# Patient Record
Sex: MALE | URBAN - METROPOLITAN AREA
[De-identification: names, ages, dates, MRNs, and addresses within clinical notes are randomized per-mention and may not be internally consistent; named-entity substitution may affect disease eponyms.]

---

## 2020-07-25 ENCOUNTER — TELEPHONE ENCOUNTER (OUTPATIENT)
Dept: URBAN - METROPOLITAN AREA CLINIC 13 | Facility: CLINIC | Age: 65
End: 2020-07-25

## 2020-07-26 ENCOUNTER — TELEPHONE ENCOUNTER (OUTPATIENT)
Dept: URBAN - METROPOLITAN AREA CLINIC 13 | Facility: CLINIC | Age: 65
End: 2020-07-26

## 2020-07-26 RX ORDER — SODIUM SULFATE, POTASSIUM SULFATE, MAGNESIUM SULFATE 17.5; 3.13; 1.6 G/ML; G/ML; G/ML
5PM THE DAY BEFORE PROCEDURE, DRINK 1/2 OF PREP, THEN 6HOURS BEFORE PROCEDURE DRINK REMAINDER OF PREP SOLUTION, CONCENTRATE ORAL
Qty: 1 | Refills: 0 | Status: ACTIVE | COMMUNITY
Start: 2019-04-18

## 2023-03-29 ENCOUNTER — TELEPHONE ENCOUNTER (OUTPATIENT)
Dept: URBAN - METROPOLITAN AREA CLINIC 72 | Facility: CLINIC | Age: 68
End: 2023-03-29

## 2023-03-29 ENCOUNTER — DASHBOARD ENCOUNTERS (OUTPATIENT)
Age: 68
End: 2023-03-29

## 2023-03-29 VITALS — HEIGHT: 73 IN | BODY MASS INDEX: 23.86 KG/M2 | WEIGHT: 180 LBS

## 2023-03-29 RX ORDER — SODIUM SULFATE, POTASSIUM SULFATE, MAGNESIUM SULFATE 17.5; 3.13; 1.6 G/ML; G/ML; G/ML
5PM THE DAY BEFORE PROCEDURE, DRINK 1/2 OF PREP, THEN 6HOURS BEFORE PROCEDURE DRINK REMAINDER OF PREP SOLUTION, CONCENTRATE ORAL
Qty: 1 | Refills: 0 | Status: ON HOLD | COMMUNITY
Start: 2019-04-18

## 2023-05-26 ENCOUNTER — TELEPHONE ENCOUNTER (OUTPATIENT)
Dept: URBAN - METROPOLITAN AREA CLINIC 72 | Facility: CLINIC | Age: 68
End: 2023-05-26

## 2023-06-01 ENCOUNTER — OFFICE VISIT (OUTPATIENT)
Dept: URBAN - METROPOLITAN AREA MEDICAL CENTER 40 | Facility: MEDICAL CENTER | Age: 68
End: 2023-06-01
Payer: MEDICARE

## 2023-06-01 DIAGNOSIS — K63.5 HYPERPLASTIC POLYP OF LARGE INTESTINE: ICD-10-CM

## 2023-06-01 DIAGNOSIS — Z86.010 HISTORY OF COLON POLYPS: ICD-10-CM

## 2023-06-01 DIAGNOSIS — K62.1 POLYP OF RECTUM: ICD-10-CM

## 2023-06-01 DIAGNOSIS — K64.0 BLEEDING GRADE I HEMORRHOIDS: ICD-10-CM

## 2023-06-01 PROCEDURE — 45385 COLONOSCOPY W/LESION REMOVAL: CPT | Performed by: INTERNAL MEDICINE

## 2025-06-17 ENCOUNTER — OFFICE VISIT (OUTPATIENT)
Dept: URBAN - METROPOLITAN AREA CLINIC 72 | Facility: CLINIC | Age: 70
End: 2025-06-17
Payer: MEDICARE

## 2025-06-17 DIAGNOSIS — K64.2 GRADE III INTERNAL HEMORRHOIDS: ICD-10-CM

## 2025-06-17 PROBLEM — 721705006: Status: ACTIVE | Noted: 2025-06-17

## 2025-06-17 PROCEDURE — 46221 LIGATION OF HEMORRHOID(S): CPT | Performed by: INTERNAL MEDICINE

## 2025-06-17 RX ORDER — SODIUM SULFATE, POTASSIUM SULFATE, MAGNESIUM SULFATE 17.5; 3.13; 1.6 G/ML; G/ML; G/ML
5PM THE DAY BEFORE PROCEDURE, DRINK 1/2 OF PREP, THEN 6HOURS BEFORE PROCEDURE DRINK REMAINDER OF PREP SOLUTION, CONCENTRATE ORAL
Qty: 1 | Refills: 0 | Status: ON HOLD | COMMUNITY
Start: 2019-04-18

## 2025-06-17 NOTE — HPI-TODAY'S VISIT:
Mr. Bowens returns for follow-up.  He was last seen as a direct access colonoscopy on 6/1/2023.  That colonoscopy was significant for 2 sigmoid and 1 rectal polyp as well as internal hemorrhoids that were mild and nonbleeding.  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  He presents today with a complaint of hemorrhoids.

## 2025-07-02 ENCOUNTER — OFFICE VISIT (OUTPATIENT)
Dept: URBAN - METROPOLITAN AREA CLINIC 72 | Facility: CLINIC | Age: 70
End: 2025-07-02
Payer: MEDICARE

## 2025-07-02 DIAGNOSIS — K64.2 GRADE III INTERNAL HEMORRHOIDS: ICD-10-CM

## 2025-07-02 PROCEDURE — 46221 LIGATION OF HEMORRHOID(S): CPT | Performed by: INTERNAL MEDICINE

## 2025-07-02 RX ORDER — SODIUM SULFATE, POTASSIUM SULFATE, MAGNESIUM SULFATE 17.5; 3.13; 1.6 G/ML; G/ML; G/ML
5PM THE DAY BEFORE PROCEDURE, DRINK 1/2 OF PREP, THEN 6HOURS BEFORE PROCEDURE DRINK REMAINDER OF PREP SOLUTION, CONCENTRATE ORAL
Qty: 1 | Refills: 0 | Status: ON HOLD | COMMUNITY
Start: 2019-04-18

## 2025-07-22 ENCOUNTER — OFFICE VISIT (OUTPATIENT)
Dept: URBAN - METROPOLITAN AREA CLINIC 72 | Facility: CLINIC | Age: 70
End: 2025-07-22

## 2025-07-30 ENCOUNTER — OFFICE VISIT (OUTPATIENT)
Dept: URBAN - METROPOLITAN AREA CLINIC 72 | Facility: CLINIC | Age: 70
End: 2025-07-30

## 2025-08-13 ENCOUNTER — OFFICE VISIT (OUTPATIENT)
Dept: URBAN - METROPOLITAN AREA CLINIC 72 | Facility: CLINIC | Age: 70
End: 2025-08-13
Payer: MEDICARE

## 2025-08-13 DIAGNOSIS — K64.2 GRADE III INTERNAL HEMORRHOIDS: ICD-10-CM

## 2025-08-13 PROCEDURE — 46221 LIGATION OF HEMORRHOID(S): CPT | Performed by: INTERNAL MEDICINE

## 2025-08-13 RX ORDER — SODIUM SULFATE, POTASSIUM SULFATE, MAGNESIUM SULFATE 17.5; 3.13; 1.6 G/ML; G/ML; G/ML
5PM THE DAY BEFORE PROCEDURE, DRINK 1/2 OF PREP, THEN 6HOURS BEFORE PROCEDURE DRINK REMAINDER OF PREP SOLUTION, CONCENTRATE ORAL
Qty: 1 | Refills: 0 | Status: ON HOLD | COMMUNITY
Start: 2019-04-18